# Patient Record
Sex: FEMALE | Race: WHITE | NOT HISPANIC OR LATINO | Employment: PART TIME | URBAN - METROPOLITAN AREA
[De-identification: names, ages, dates, MRNs, and addresses within clinical notes are randomized per-mention and may not be internally consistent; named-entity substitution may affect disease eponyms.]

---

## 2019-08-09 ENCOUNTER — TELEPHONE (OUTPATIENT)
Dept: OTOLARYNGOLOGY | Facility: CLINIC | Age: 54
End: 2019-08-09

## 2019-08-09 NOTE — TELEPHONE ENCOUNTER
Call pt to inform her the change in appt time no answer left VM with return message. Pt can come in for either 2:30-45

## 2019-08-09 NOTE — TELEPHONE ENCOUNTER
----- Message from Birgit Wilkerson sent at 8/9/2019  4:29 PM CDT -----  Contact: MANNY ROSSI [[id number]]  Type:  Patient Returning Call    Who Called: MANNY ROSSI [[id number]]    Who Left Message for Patient: Moriah    Does the patient know what this is regarding?: yes    Best Call Back Number: [phone]    Additional Information:  Patient can called to confirm that she can come in at 2:30

## 2019-08-12 ENCOUNTER — OFFICE VISIT (OUTPATIENT)
Dept: OTOLARYNGOLOGY | Facility: CLINIC | Age: 54
End: 2019-08-12
Payer: COMMERCIAL

## 2019-08-12 VITALS
DIASTOLIC BLOOD PRESSURE: 91 MMHG | SYSTOLIC BLOOD PRESSURE: 129 MMHG | WEIGHT: 147 LBS | HEIGHT: 62 IN | BODY MASS INDEX: 27.05 KG/M2 | HEART RATE: 71 BPM

## 2019-08-12 DIAGNOSIS — J34.3 HYPERTROPHY OF BOTH INFERIOR NASAL TURBINATES: ICD-10-CM

## 2019-08-12 DIAGNOSIS — J34.2 NASAL SEPTAL DEVIATION: Primary | ICD-10-CM

## 2019-08-12 DIAGNOSIS — J34.89 REFRACTORY OBSTRUCTION OF NASAL AIRWAY: ICD-10-CM

## 2019-08-12 DIAGNOSIS — M95.0 NASAL VALVE COLLAPSE: ICD-10-CM

## 2019-08-12 DIAGNOSIS — J30.9 ALLERGIC RHINITIS, UNSPECIFIED SEASONALITY, UNSPECIFIED TRIGGER: ICD-10-CM

## 2019-08-12 PROCEDURE — 31231 NASAL ENDOSCOPY DX: CPT | Mod: S$GLB,,, | Performed by: SPECIALIST

## 2019-08-12 PROCEDURE — 3008F PR BODY MASS INDEX (BMI) DOCUMENTED: ICD-10-PCS | Mod: CPTII,S$GLB,, | Performed by: SPECIALIST

## 2019-08-12 PROCEDURE — 99204 PR OFFICE/OUTPT VISIT, NEW, LEVL IV, 45-59 MIN: ICD-10-PCS | Mod: 25,S$GLB,, | Performed by: SPECIALIST

## 2019-08-12 PROCEDURE — 31231 PR NASAL ENDOSCOPY, DX: ICD-10-PCS | Mod: S$GLB,,, | Performed by: SPECIALIST

## 2019-08-12 PROCEDURE — 99204 OFFICE O/P NEW MOD 45 MIN: CPT | Mod: 25,S$GLB,, | Performed by: SPECIALIST

## 2019-08-12 PROCEDURE — 3008F BODY MASS INDEX DOCD: CPT | Mod: CPTII,S$GLB,, | Performed by: SPECIALIST

## 2019-08-12 RX ORDER — LEVOTHYROXINE SODIUM 88 UG/1
TABLET ORAL
Refills: 2 | COMMUNITY
Start: 2019-07-25

## 2019-08-12 RX ORDER — BUTALBITAL, ACETAMINOPHEN AND CAFFEINE 50; 325; 40 MG/1; MG/1; MG/1
TABLET ORAL
COMMUNITY
Start: 2014-05-20

## 2019-08-12 RX ORDER — FLUOXETINE HYDROCHLORIDE 20 MG/1
20 CAPSULE ORAL DAILY
Refills: 2 | COMMUNITY
Start: 2019-07-25

## 2019-08-12 RX ORDER — FLUOXETINE HYDROCHLORIDE 20 MG/1
CAPSULE ORAL
COMMUNITY
Start: 2013-02-16

## 2019-08-12 RX ORDER — TRAZODONE HYDROCHLORIDE 100 MG/1
100 TABLET ORAL NIGHTLY
Refills: 3 | COMMUNITY
Start: 2019-06-13

## 2019-08-12 RX ORDER — ESTRADIOL 2 MG/1
2 TABLET ORAL DAILY
Refills: 1 | COMMUNITY
Start: 2019-07-25

## 2019-08-12 RX ORDER — FLUTICASONE PROPIONATE 50 MCG
SPRAY, SUSPENSION (ML) NASAL
Qty: 1 BOTTLE | Refills: 11 | Status: SHIPPED | OUTPATIENT
Start: 2019-08-12

## 2019-08-12 RX ORDER — ALPRAZOLAM 0.5 MG/1
TABLET ORAL
Refills: 0 | COMMUNITY
Start: 2019-06-13

## 2019-08-12 RX ORDER — AZELASTINE 1 MG/ML
SPRAY, METERED NASAL
Qty: 30 ML | Refills: 11 | Status: SHIPPED | OUTPATIENT
Start: 2019-08-12

## 2019-08-12 NOTE — PROGRESS NOTES
Subjective:       Patient ID: Radha Paredes is a 54 y.o. female.    Chief Complaint: Sinus Problem    The patient is coming in for evaluation of chronic nasal obstruction.  It is particularly bothersome to her on the right side of her nose.  When she lays with the right side down she has to pull her nostril and cheek laterally to be able to breathe through the nose. She has used different antihistamines and decongestants with little success.  She does find that her breathing improves when she use breathe right strips.  Nasal secretions are clear.  She does not have significant history of sinusitis.  The nasal obstruction results in her having difficulty in sleeping.  She turns from side to side most nights.    Review of Systems   Constitutional: Negative for activity change, appetite change, chills, fatigue, fever and unexpected weight change.   HENT: Positive for congestion, postnasal drip, rhinorrhea and sore throat. Negative for ear discharge, ear pain, facial swelling, hearing loss, mouth sores, sinus pressure, sinus pain, sneezing, tinnitus, trouble swallowing and voice change.         Blockage of the nose, worse on the right   Eyes: Negative for photophobia, pain, discharge, redness, itching and visual disturbance.   Respiratory: Negative for apnea, cough, choking, shortness of breath and wheezing.    Cardiovascular: Negative for chest pain and palpitations.   Gastrointestinal: Negative for abdominal distention, abdominal pain, nausea and vomiting.   Musculoskeletal: Negative for arthralgias, myalgias, neck pain and neck stiffness.   Skin: Negative.  Negative for color change, pallor and rash.   Allergic/Immunologic: Negative for environmental allergies, food allergies and immunocompromised state.   Neurological: Positive for headaches. Negative for dizziness, facial asymmetry, speech difficulty, weakness, light-headedness and numbness.   Hematological: Negative for adenopathy. Does not bruise/bleed  easily.   Psychiatric/Behavioral: Negative for confusion, decreased concentration and sleep disturbance.       Objective:      Physical Exam   Constitutional: She is oriented to person, place, and time. She appears well-developed and well-nourished. She is cooperative.   HENT:   Head: Normocephalic.   Right Ear: External ear and ear canal normal. Tympanic membrane is retracted.   Left Ear: External ear and ear canal normal. Tympanic membrane is retracted.   Nose: Mucosal edema (cyanotic, boggy, hypertrophic inferior turbinates bilaterally ), rhinorrhea (clear mucus bilaterally), nasal deformity (External deviation to the right, visible collapse of both internal nasal valves and total collapse of left external nasal valve with deep inspiration) and septal deviation ( septum deviated to the right inferior septum dislocated off nasal spine to the right) present.   Mouth/Throat: Uvula is midline, oropharynx is clear and moist and mucous membranes are normal. No oral lesions.   Eyes: Pupils are equal, round, and reactive to light. EOM and lids are normal. Right eye exhibits no discharge and no exudate. Left eye exhibits no discharge and no exudate. Right conjunctiva is injected. Left conjunctiva is injected.   Neck: Trachea normal and normal range of motion. No muscular tenderness present. No tracheal deviation present. No thyroid mass and no thyromegaly present.   Cardiovascular: Normal rate, regular rhythm, normal heart sounds and normal pulses.   Pulmonary/Chest: Effort normal and breath sounds normal. No stridor. She has no decreased breath sounds. She has no wheezes. She has no rhonchi. She has no rales.   Abdominal: Soft. Bowel sounds are normal. There is no tenderness.   Musculoskeletal: Normal range of motion.   Lymphadenopathy:        Head (right side): No submental, no submandibular, no preauricular, no posterior auricular and no occipital adenopathy present.        Head (left side): No submental, no  submandibular, no preauricular, no posterior auricular and no occipital adenopathy present.     She has no cervical adenopathy.   Neurological: She is alert and oriented to person, place, and time. She has normal strength. No cranial nerve deficit or sensory deficit. Gait normal.   Skin: Skin is warm and dry. No petechiae and no rash noted. No cyanosis. Nails show no clubbing.   Psychiatric: She has a normal mood and affect. Her speech is normal and behavior is normal. Judgment and thought content normal. Cognition and memory are normal.       Rigid nasal endoscopy-narrowing of right internal nasal valve with complete collapse of both internal nasal valves with deep inspiration, total collapse of left external nasal valve with deep inspiration, septum deviated to the right and dislocated off the cyst nasal spine to the right, posterior bony spur in the septum to the left, inferior turbinates enlarged bilaterally, no other mucosal lesions noted    Assessment:       1. Nasal septal deviation    2. Nasal valve collapse    3. Hypertrophy of both inferior nasal turbinates    4. Allergic rhinitis, unspecified seasonality, unspecified trigger    5. Refractory obstruction of nasal airway        Plan:       I am having the patient use combination of Astelin and Flonase twice daily routinely.  She will also use saline irrigations.  I will recheck her in 4 weeks.  She can use breathe right nasal strips if needed.

## 2019-08-12 NOTE — PROCEDURES
"Nasal/sinus endoscopy  Date/Time: 8/12/2019 4:10 PM    Time out: Immediately prior to procedure a "time out" was called to verify the correct patient, procedure, equipment, support staff and site/side marked as required.  Performed by: MATIAS Ramos MD  Authorized by: MATIAS Ramos MD     Consent Done?:  Yes (Verbal)  Anesthesia:     Local anesthetic:  Lidocaine 2% and Shantanu-Synephrine 1/2% (Topical aerosol)    Location: Bilateral rigid nasal endoscopy with video.    Endoscope type: 0 degree, 3 mm rigid nasal telescope.    Patient tolerance:  Patient tolerated the procedure well with no immediate complications  External:      External nasal deformity (Straight external deviation of the cartilaginous nasal structures to the right, collapse of internal nasal valves bilaterally and left external nasal valve with deep inspiration )  Intranasal:      Mucosa no polyps     Mucosa ulcers not present     No mucosa lesions present ( clear mucus in the nasal passages bilaterally)     Enlarged turbinates ( inferior turbinates enlarged bilaterally)     Septum gross deformity (Nasal septum dislocated off nasal spine inferiorly to the right, nasal septum deviated to the right anteriorly, large posterior bony spur to the left )  Nasopharynx:      No mucosa lesions     Adenoids not present     Posterior choanae patent     Eustachian tube patent     Rigid nasal endoscopy-anterior septal deviation to the right with septum dislocated off nasal spine to the right inferiorly, posterior bony spur of the septum to the left, inferior turbinates enlarged bilaterally, narrowing of the right internal nasal valve and complete collapse of both internal nasal valves with deep inspiration, collapse of left external nasal valve with deep inspiration      "

## 2023-06-26 ENCOUNTER — NURSE TRIAGE (OUTPATIENT)
Dept: ADMINISTRATIVE | Facility: CLINIC | Age: 58
End: 2023-06-26

## 2023-06-27 NOTE — TELEPHONE ENCOUNTER
Son states she had an outpatient surgery today. She last has not urinated since surgery this morning. She states she thinks the catheter was removed at about 11:30am. He states the dr is calling him right now and no longer needs triage.  Reason for Disposition   Caller has already spoken with the PCP and has no further questions.    Protocols used: No Contact or Duplicate Contact Call-A-AH